# Patient Record
Sex: FEMALE | Race: WHITE | NOT HISPANIC OR LATINO | URBAN - METROPOLITAN AREA
[De-identification: names, ages, dates, MRNs, and addresses within clinical notes are randomized per-mention and may not be internally consistent; named-entity substitution may affect disease eponyms.]

---

## 2017-09-20 ENCOUNTER — IMPORTED ENCOUNTER (OUTPATIENT)
Dept: URBAN - METROPOLITAN AREA CLINIC 50 | Facility: CLINIC | Age: 67
End: 2017-09-20

## 2017-09-20 NOTE — PATIENT DISCUSSION
"""[facility] "" ""Informed patient that their cataract is visually significant and meets the criteria for cataract surgery to increase their vision and decrease their glare symptoms.  RBALs of surgery discussed

## 2018-03-27 ENCOUNTER — IMPORTED ENCOUNTER (OUTPATIENT)
Dept: URBAN - METROPOLITAN AREA CLINIC 50 | Facility: CLINIC | Age: 68
End: 2018-03-27

## 2018-07-11 ENCOUNTER — IMPORTED ENCOUNTER (OUTPATIENT)
Dept: URBAN - METROPOLITAN AREA CLINIC 50 | Facility: CLINIC | Age: 68
End: 2018-07-11

## 2018-09-04 ENCOUNTER — IMPORTED ENCOUNTER (OUTPATIENT)
Dept: URBAN - METROPOLITAN AREA CLINIC 50 | Facility: CLINIC | Age: 68
End: 2018-09-04

## 2018-09-04 NOTE — PATIENT DISCUSSION
"""[place] Target: Distance

## 2018-09-13 ENCOUNTER — IMPORTED ENCOUNTER (OUTPATIENT)
Dept: URBAN - METROPOLITAN AREA CLINIC 50 | Facility: CLINIC | Age: 68
End: 2018-09-13

## 2018-09-13 NOTE — PATIENT DISCUSSION
"""S/P IOL OD: Tecnis ZCB00 23 (Target: Distance) +ORA/Femto/Arcs +TM. Continue post operative instructions and drops per schedule.  """

## 2018-09-18 ENCOUNTER — IMPORTED ENCOUNTER (OUTPATIENT)
Dept: URBAN - METROPOLITAN AREA CLINIC 50 | Facility: CLINIC | Age: 68
End: 2018-09-18

## 2018-09-27 ENCOUNTER — IMPORTED ENCOUNTER (OUTPATIENT)
Dept: URBAN - METROPOLITAN AREA CLINIC 50 | Facility: CLINIC | Age: 68
End: 2018-09-27

## 2018-09-27 NOTE — PATIENT DISCUSSION
"""S/P IOL OS: Tecnis ZCB00 21.5 (Target: Near) +Femto/Arcs +TM. Continue post operative instructions and drops per schedule.  """

## 2018-10-02 ENCOUNTER — IMPORTED ENCOUNTER (OUTPATIENT)
Dept: URBAN - METROPOLITAN AREA CLINIC 50 | Facility: CLINIC | Age: 68
End: 2018-10-02

## 2018-10-12 ENCOUNTER — IMPORTED ENCOUNTER (OUTPATIENT)
Dept: URBAN - METROPOLITAN AREA CLINIC 50 | Facility: CLINIC | Age: 68
End: 2018-10-12

## 2018-10-12 NOTE — PATIENT DISCUSSION
"""S/P IOL OU: OD: Tecnis ZCB00 23 (Target: Distance) +ORAFemto/Arcs +TM. OS: Tecnis ZCB00 21.5 (Target: Near)/Arcs +TM. MRx given today. "

## 2019-03-14 ENCOUNTER — IMPORTED ENCOUNTER (OUTPATIENT)
Dept: URBAN - METROPOLITAN AREA CLINIC 50 | Facility: CLINIC | Age: 69
End: 2019-03-14

## 2019-09-10 ENCOUNTER — IMPORTED ENCOUNTER (OUTPATIENT)
Dept: URBAN - METROPOLITAN AREA CLINIC 50 | Facility: CLINIC | Age: 69
End: 2019-09-10

## 2020-03-10 ENCOUNTER — APPOINTMENT (RX ONLY)
Dept: URBAN - METROPOLITAN AREA CLINIC 86 | Facility: CLINIC | Age: 70
Setting detail: DERMATOLOGY
End: 2020-03-10

## 2020-03-10 ENCOUNTER — IMPORTED ENCOUNTER (OUTPATIENT)
Dept: URBAN - METROPOLITAN AREA CLINIC 50 | Facility: CLINIC | Age: 70
End: 2020-03-10

## 2020-03-10 DIAGNOSIS — L81.4 OTHER MELANIN HYPERPIGMENTATION: ICD-10-CM

## 2020-03-10 DIAGNOSIS — L82.1 OTHER SEBORRHEIC KERATOSIS: ICD-10-CM

## 2020-03-10 DIAGNOSIS — L85.3 XEROSIS CUTIS: ICD-10-CM

## 2020-03-10 DIAGNOSIS — L57.0 ACTINIC KERATOSIS: ICD-10-CM

## 2020-03-10 DIAGNOSIS — L57.8 OTHER SKIN CHANGES DUE TO CHRONIC EXPOSURE TO NONIONIZING RADIATION: ICD-10-CM

## 2020-03-10 PROCEDURE — 17003 DESTRUCT PREMALG LES 2-14: CPT

## 2020-03-10 PROCEDURE — ? TREATMENT REGIMEN

## 2020-03-10 PROCEDURE — 17000 DESTRUCT PREMALG LESION: CPT

## 2020-03-10 PROCEDURE — 99203 OFFICE O/P NEW LOW 30 MIN: CPT | Mod: 25

## 2020-03-10 PROCEDURE — ? LIQUID NITROGEN

## 2020-03-10 PROCEDURE — ? COUNSELING

## 2020-03-10 PROCEDURE — ? FULL BODY SKIN EXAM - DECLINED

## 2020-03-10 ASSESSMENT — LOCATION DETAILED DESCRIPTION DERM
LOCATION DETAILED: LEFT ANTECUBITAL SKIN
LOCATION DETAILED: LEFT ELBOW
LOCATION DETAILED: RIGHT POSTERIOR SHOULDER
LOCATION DETAILED: LEFT ELBOW
LOCATION DETAILED: LEFT MEDIAL SUPERIOR CHEST
LOCATION DETAILED: LEFT CRUS OF HELIX
LOCATION DETAILED: RIGHT ELBOW
LOCATION DETAILED: LEFT ANTERIOR SHOULDER
LOCATION DETAILED: LEFT POSTERIOR SHOULDER

## 2020-03-10 ASSESSMENT — LOCATION SIMPLE DESCRIPTION DERM
LOCATION SIMPLE: LEFT ELBOW
LOCATION SIMPLE: RIGHT SHOULDER
LOCATION SIMPLE: LEFT SHOULDER
LOCATION SIMPLE: LEFT EAR
LOCATION SIMPLE: LEFT ELBOW
LOCATION SIMPLE: CHEST
LOCATION SIMPLE: LEFT UPPER ARM
LOCATION SIMPLE: RIGHT ELBOW

## 2020-03-10 ASSESSMENT — LOCATION ZONE DERM
LOCATION ZONE: TRUNK
LOCATION ZONE: EAR
LOCATION ZONE: ARM
LOCATION ZONE: ARM

## 2020-03-10 NOTE — PROCEDURE: FULL BODY SKIN EXAM - DECLINED
Body Of Note (Please Add Your Own Text Here): Will schedule a FBSE
Detail Level: Simple
Instructions: This plan will send the code FBSD to the PM system.  DO NOT or CHANGE the price.
Price (Do Not Change): 0.00

## 2021-04-02 ENCOUNTER — APPOINTMENT (RX ONLY)
Dept: URBAN - METROPOLITAN AREA CLINIC 86 | Facility: CLINIC | Age: 71
Setting detail: DERMATOLOGY
End: 2021-04-02

## 2021-04-02 DIAGNOSIS — L57.8 OTHER SKIN CHANGES DUE TO CHRONIC EXPOSURE TO NONIONIZING RADIATION: ICD-10-CM

## 2021-04-02 DIAGNOSIS — L57.0 ACTINIC KERATOSIS: ICD-10-CM

## 2021-04-02 DIAGNOSIS — L82.1 OTHER SEBORRHEIC KERATOSIS: ICD-10-CM

## 2021-04-02 DIAGNOSIS — L82.0 INFLAMED SEBORRHEIC KERATOSIS: ICD-10-CM

## 2021-04-02 PROCEDURE — 17003 DESTRUCT PREMALG LES 2-14: CPT | Mod: 59

## 2021-04-02 PROCEDURE — ? LIQUID NITROGEN

## 2021-04-02 PROCEDURE — 99213 OFFICE O/P EST LOW 20 MIN: CPT | Mod: 25

## 2021-04-02 PROCEDURE — ? COUNSELING

## 2021-04-02 PROCEDURE — 17000 DESTRUCT PREMALG LESION: CPT | Mod: 59

## 2021-04-02 PROCEDURE — 17110 DESTRUCTION B9 LES UP TO 14: CPT

## 2021-04-02 PROCEDURE — ? ADDITIONAL NOTES

## 2021-04-02 ASSESSMENT — LOCATION ZONE DERM
LOCATION ZONE: LIP
LOCATION ZONE: TRUNK
LOCATION ZONE: FACE
LOCATION ZONE: ARM
LOCATION ZONE: HAND

## 2021-04-02 ASSESSMENT — LOCATION DETAILED DESCRIPTION DERM
LOCATION DETAILED: RIGHT VENTRAL DISTAL FOREARM
LOCATION DETAILED: RIGHT SUPERIOR FOREHEAD
LOCATION DETAILED: LEFT LATERAL MALAR CHEEK
LOCATION DETAILED: RIGHT FOREHEAD
LOCATION DETAILED: LEFT ULNAR DORSAL HAND
LOCATION DETAILED: RIGHT RADIAL DORSAL HAND
LOCATION DETAILED: RIGHT UPPER CUTANEOUS LIP
LOCATION DETAILED: LEFT MEDIAL FOREHEAD
LOCATION DETAILED: LEFT CLAVICULAR SKIN
LOCATION DETAILED: LEFT ANTERIOR SHOULDER
LOCATION DETAILED: LEFT LATERAL SUPERIOR CHEST
LOCATION DETAILED: LEFT MEDIAL SUPERIOR CHEST
LOCATION DETAILED: RIGHT PROXIMAL RADIAL DORSAL FOREARM
LOCATION DETAILED: RIGHT DISTAL RADIAL DORSAL FOREARM
LOCATION DETAILED: RIGHT LATERAL FOREHEAD

## 2021-04-02 ASSESSMENT — LOCATION SIMPLE DESCRIPTION DERM
LOCATION SIMPLE: RIGHT FOREHEAD
LOCATION SIMPLE: RIGHT LIP
LOCATION SIMPLE: RIGHT HAND
LOCATION SIMPLE: LEFT CLAVICULAR SKIN
LOCATION SIMPLE: LEFT SHOULDER
LOCATION SIMPLE: RIGHT FOREARM
LOCATION SIMPLE: LEFT CHEEK
LOCATION SIMPLE: LEFT FOREHEAD
LOCATION SIMPLE: LEFT HAND
LOCATION SIMPLE: CHEST

## 2021-04-02 NOTE — PROCEDURE: LIQUID NITROGEN
Post-Care Instructions: I reviewed with the patient in detail post-care instructions. Patient is to wear sunprotection, and avoid picking at any of the treated lesions. Pt may apply Vaseline to crusted or scabbing areas.
Number Of Freeze-Thaw Cycles: 2 freeze-thaw cycles
Render Post-Care Instructions In Note?: no
Detail Level: Detailed
Duration Of Freeze Thaw-Cycle (Seconds): 0
Consent: The patient's consent was obtained including but not limited to risks of crusting, scabbing, blistering, scarring, darker or lighter pigmentary change, recurrence, incomplete removal and infection.
Medical Necessity Clause: This procedure was medically necessary because the lesions that were treated were:
Medical Necessity Information: It is in your best interest to select a reason for this procedure from the list below. All of these items fulfill various CMS LCD requirements except the new and changing color options.

## 2021-04-17 ASSESSMENT — VISUAL ACUITY
OD_BAT: 20/100
OD_SC: 20/25
OS_BAT: 20/100
OD_BAT: 20/30
OS_PH: 20/20-2
OD_SC: 20/20
OS_OTHER: 20/100. >20/400.
OD_SC: 20/25-2
OS_PH: 20/25
OD_SC: 20/20
OS_PH: 20/30+1
OD_BAT: 20/25+
OS_SC: 20/50
OS_CC: J1+
OD_OTHER: 20/30. 20/40.
OS_PH: 20/50
OS_BAT: 20/100
OD_CC: J1+
OS_OTHER: 20/30. 20/40.
OS_CC: J1
OS_BAT: 20/30
OS_SC: 20/60-1
OS_SC: 20/40-2
OD_OTHER: 20/100. >20/400.
OS_SC: 20/50-2
OS_SC: 20/60-
OD_CC: J1@ 16 IN
OS_CC: J1+
OS_OTHER: 20/100. >20/400.
OS_OTHER: 20/20. 20/400.
OD_SC: 20/30-
OD_SC: 20/20-
OD_SC: 20/25
OS_SC: 20/50-2
OD_SC: 20/25
OS_BAT: 20/20
OD_CC: J1
OD_CC: J1+
OS_SC: 20/50-2
OS_SC: 20/50
OS_CC: J1@ 16 IN
OD_SC: 20/25

## 2021-04-17 ASSESSMENT — TONOMETRY
OS_IOP_MMHG: 15
OS_IOP_MMHG: 11
OD_IOP_MMHG: 12
OD_IOP_MMHG: 12
OD_IOP_MMHG: 10
OD_IOP_MMHG: 16
OS_IOP_MMHG: 18
OS_IOP_MMHG: 16
OD_IOP_MMHG: 16
OS_IOP_MMHG: 10
OS_IOP_MMHG: 12
OS_IOP_MMHG: 14
OS_IOP_MMHG: 16
OS_IOP_MMHG: 10
OD_IOP_MMHG: 16
OD_IOP_MMHG: 12
OD_IOP_MMHG: 15
OD_IOP_MMHG: 10

## 2021-07-30 ENCOUNTER — APPOINTMENT (RX ONLY)
Dept: URBAN - METROPOLITAN AREA CLINIC 86 | Facility: CLINIC | Age: 71
Setting detail: DERMATOLOGY
End: 2021-07-30

## 2021-07-30 DIAGNOSIS — L81.4 OTHER MELANIN HYPERPIGMENTATION: ICD-10-CM

## 2021-07-30 DIAGNOSIS — L57.0 ACTINIC KERATOSIS: ICD-10-CM

## 2021-07-30 DIAGNOSIS — L57.8 OTHER SKIN CHANGES DUE TO CHRONIC EXPOSURE TO NONIONIZING RADIATION: ICD-10-CM

## 2021-07-30 PROCEDURE — ? COUNSELING

## 2021-07-30 PROCEDURE — ? LIQUID NITROGEN

## 2021-07-30 PROCEDURE — 17003 DESTRUCT PREMALG LES 2-14: CPT

## 2021-07-30 PROCEDURE — 17000 DESTRUCT PREMALG LESION: CPT

## 2021-07-30 PROCEDURE — 99213 OFFICE O/P EST LOW 20 MIN: CPT | Mod: 25

## 2021-07-30 ASSESSMENT — LOCATION DETAILED DESCRIPTION DERM
LOCATION DETAILED: NASAL ROOT
LOCATION DETAILED: RIGHT FOREHEAD
LOCATION DETAILED: NASAL DORSUM
LOCATION DETAILED: RIGHT INFERIOR LATERAL MALAR CHEEK
LOCATION DETAILED: RIGHT MEDIAL FOREHEAD
LOCATION DETAILED: RIGHT CENTRAL MALAR CHEEK
LOCATION DETAILED: RIGHT SUPERIOR MEDIAL BUCCAL CHEEK

## 2021-07-30 ASSESSMENT — LOCATION SIMPLE DESCRIPTION DERM
LOCATION SIMPLE: RIGHT CHEEK
LOCATION SIMPLE: RIGHT FOREHEAD
LOCATION SIMPLE: NOSE

## 2021-07-30 ASSESSMENT — LOCATION ZONE DERM
LOCATION ZONE: FACE
LOCATION ZONE: NOSE

## 2021-07-30 NOTE — PROCEDURE: LIQUID NITROGEN
Show Applicator Variable?: Yes
Detail Level: Detailed
Render Post-Care Instructions In Note?: no
Consent: The patient's consent was obtained including but not limited to risks of crusting, scabbing, blistering, scarring, darker or lighter pigmentary change, recurrence, incomplete removal and infection.
Number Of Freeze-Thaw Cycles: 2 freeze-thaw cycles
Duration Of Freeze Thaw-Cycle (Seconds): 0
Post-Care Instructions: I reviewed with the patient in detail post-care instructions. Patient is to wear sunprotection, and avoid picking at any of the treated lesions. Pt may apply Vaseline to crusted or scabbing areas.